# Patient Record
Sex: MALE | Race: WHITE | URBAN - METROPOLITAN AREA
[De-identification: names, ages, dates, MRNs, and addresses within clinical notes are randomized per-mention and may not be internally consistent; named-entity substitution may affect disease eponyms.]

---

## 2017-01-30 ENCOUNTER — OFFICE VISIT (OUTPATIENT)
Dept: FAMILY MEDICINE | Facility: CLINIC | Age: 37
End: 2017-01-30
Payer: COMMERCIAL

## 2017-01-30 VITALS
OXYGEN SATURATION: 100 % | BODY MASS INDEX: 24 KG/M2 | HEART RATE: 82 BPM | TEMPERATURE: 97.7 F | DIASTOLIC BLOOD PRESSURE: 85 MMHG | WEIGHT: 172 LBS | SYSTOLIC BLOOD PRESSURE: 134 MMHG

## 2017-01-30 DIAGNOSIS — F90.1 ATTENTION-DEFICIT HYPERACTIVITY DISORDER, PREDOMINANTLY HYPERACTIVE TYPE: Primary | ICD-10-CM

## 2017-01-30 PROCEDURE — 99213 OFFICE O/P EST LOW 20 MIN: CPT | Performed by: FAMILY MEDICINE

## 2017-01-30 RX ORDER — DEXTROAMPHETAMINE SACCHARATE, AMPHETAMINE ASPARTATE, DEXTROAMPHETAMINE SULFATE AND AMPHETAMINE SULFATE 5; 5; 5; 5 MG/1; MG/1; MG/1; MG/1
20 TABLET ORAL 2 TIMES DAILY
Qty: 60 TABLET | Refills: 0 | Status: SHIPPED | OUTPATIENT
Start: 2017-01-30 | End: 2017-04-19

## 2017-01-30 RX ORDER — DEXTROAMPHETAMINE SACCHARATE, AMPHETAMINE ASPARTATE, DEXTROAMPHETAMINE SULFATE AND AMPHETAMINE SULFATE 5; 5; 5; 5 MG/1; MG/1; MG/1; MG/1
20 TABLET ORAL 2 TIMES DAILY
Qty: 60 TABLET | Refills: 0 | Status: SHIPPED | OUTPATIENT
Start: 2017-02-28 | End: 2017-04-19

## 2017-01-30 RX ORDER — DEXTROAMPHETAMINE SACCHARATE, AMPHETAMINE ASPARTATE, DEXTROAMPHETAMINE SULFATE AND AMPHETAMINE SULFATE 5; 5; 5; 5 MG/1; MG/1; MG/1; MG/1
20 TABLET ORAL 2 TIMES DAILY
Qty: 60 TABLET | Refills: 0 | Status: SHIPPED | OUTPATIENT
Start: 2017-03-30

## 2017-01-30 ASSESSMENT — PAIN SCALES - GENERAL: PAINLEVEL: NO PAIN (0)

## 2017-01-30 NOTE — MR AVS SNAPSHOT
"              After Visit Summary   2017    Randall Yu    MRN: [id number]           Patient Information     Date Of Birth          1980        Visit Information        Provider Department      2017 3:00 PM Romero Tobin MD [facility]        Today's Diagnoses     Attention-deficit hyperactivity disorder, predominantly hyperactive type    -  1        Follow-ups after your visit        Who to contact     If you have questions or need follow up information about today's clinic visit or your schedule please contact [facility] directly at [phone].  Normal or non-critical lab and imaging results will be communicated to you by Pingpigeonhart, letter or phone within 4 business days after the clinic has received the results. If you do not hear from us within 7 days, please contact the clinic through Pingpigeonhart or phone. If you have a critical or abnormal lab result, we will notify you by phone as soon as possible.  Submit refill requests through [employer] or call your pharmacy and they will forward the refill request to us. Please allow 3 business days for your refill to be completed.          Additional Information About Your Visit        MyChart Information     [employer] lets you send messages to your doctor, view your test results, renew your prescriptions, schedule appointments and more. To sign up, go to [email or web address] . Click on \"Log in\" on the left side of the screen, which will take you to the Welcome page. Then click on \"Sign up Now\" on the right side of the page.     You will be asked to enter the access code listed below, as well as some personal information. Please follow the directions to create your username and password.     Your access code is: [email or web address]  Expires: 2017  4:02 PM     Your access code will  in 90 days. If you need help or a new code, please call your [facility] or [phone].        Care EveryWhere ID     " This is your Care EveryWhere ID. This could be used by other organizations to access your [place] medical records  [id number]        Your Vitals Were     Pulse Temperature Pulse Oximetry             82 97.7  F (36.5  C) (Oral) 100%          Blood Pressure from Last 3 Encounters:   01/30/17 134/85   12/13/16 124/87   12/01/16 134/84    Weight from Last 3 Encounters:   01/30/17 172 lb (78.019 kg)   12/13/16 166 lb (75.297 kg)   12/01/16 168 lb (76.204 kg)              Today, you had the following     No orders found for display         Today's Medication Changes          These changes are accurate as of: 1/30/17  4:02 PM.  If you have any questions, ask your nurse or doctor.               These medicines have changed or have updated prescriptions.        Dose/Directions    * amphetamine-dextroamphetamine 20 MG per tablet   Commonly known as:  ADDERALL   This may have changed:  Another medication with the same name was added. Make sure you understand how and when to take each.   Used for:  Attention-deficit hyperactivity disorder, predominantly hyperactive type   Changed by:  Romero Tobin MD        Dose:  20 mg   Take 1 tablet (20 mg) by mouth 2 times daily   Quantity:  60 tablet   Refills:  0       * amphetamine-dextroamphetamine 20 MG per tablet   Commonly known as:  ADDERALL   This may have changed:  You were already taking a medication with the same name, and this prescription was added. Make sure you understand how and when to take each.   Used for:  Attention-deficit hyperactivity disorder, predominantly hyperactive type   Changed by:  Romero Tobin MD        Dose:  20 mg   Take 1 tablet (20 mg) by mouth 2 times daily   Quantity:  60 tablet   Refills:  0       * amphetamine-dextroamphetamine 20 MG per tablet   Commonly known as:  ADDERALL   This may have changed:  You were already taking a medication with the same name, and this prescription was added. Make sure you understand how and when to  take each.   Used for:  Attention-deficit hyperactivity disorder, predominantly hyperactive type   Changed by:  Romero Tobin MD        Dose:  20 mg   Start taking on:  2/28/2017   Take 1 tablet (20 mg) by mouth 2 times daily   Quantity:  60 tablet   Refills:  0       * amphetamine-dextroamphetamine 20 MG per tablet   Commonly known as:  ADDERALL   This may have changed:  You were already taking a medication with the same name, and this prescription was added. Make sure you understand how and when to take each.   Used for:  Attention-deficit hyperactivity disorder, predominantly hyperactive type   Changed by:  Romero Tobin MD        Dose:  20 mg   Start taking on:  3/30/2017   Take 1 tablet (20 mg) by mouth 2 times daily   Quantity:  60 tablet   Refills:  0       * Notice:  This list has 4 medication(s) that are the same as other medications prescribed for you. Read the directions carefully, and ask your doctor or other care provider to review them with you.         Where to get your medicines      Some of these will need a paper prescription and others can be bought over the counter.  Ask your nurse if you have questions.     Bring a paper prescription for each of these medications    - amphetamine-dextroamphetamine 20 MG per tablet  - amphetamine-dextroamphetamine 20 MG per tablet  - amphetamine-dextroamphetamine 20 MG per tablet             Primary Care Provider    None Specified       No primary provider on file.        Thank you!     Thank you for choosing [facility]  for your care. Our goal is always to provide you with excellent care. Hearing back from our patients is one way we can continue to improve our services. Please take a few minutes to complete the written survey that you may receive in the mail after your visit with us. Thank you!             Your Updated Medication List - Protect others around you: Learn how to safely use, store and throw away your medicines  at www.disposemymeds.org.          This list is accurate as of: 1/30/17  4:02 PM.  Always use your most recent med list.                   Brand Name Dispense Instructions for use    * amphetamine-dextroamphetamine 20 MG per tablet    ADDERALL    60 tablet    Take 1 tablet (20 mg) by mouth 2 times daily       * amphetamine-dextroamphetamine 20 MG per tablet    ADDERALL    60 tablet    Take 1 tablet (20 mg) by mouth 2 times daily       * amphetamine-dextroamphetamine 20 MG per tablet   Start taking on:  2/28/2017    ADDERALL    60 tablet    Take 1 tablet (20 mg) by mouth 2 times daily       * amphetamine-dextroamphetamine 20 MG per tablet   Start taking on:  3/30/2017    ADDERALL    60 tablet    Take 1 tablet (20 mg) by mouth 2 times daily       PARoxetine 20 MG tablet    PAXIL    90 tablet    Take 1 tablet (20 mg) by mouth At Bedtime       ZANTAC 150 MG tablet   Generic drug:  ranitidine      Take 150 mg by mouth daily       * Notice:  This list has 4 medication(s) that are the same as other medications prescribed for you. Read the directions carefully, and ask your doctor or other care provider to review them with you.

## 2017-01-30 NOTE — PROGRESS NOTES
SUBJECTIVE:                                                    Randall Yu is a 36 year old male who presents to clinic today for the following health issues:      Medication Followup of  For his Adderall       Taking Medication as prescribed: yes    Side Effects:  None    Medication Helping Symptoms:  yes     He feels that he uses his medication during the week only   He does not have loss of appetite  He has no involuntary movement   No trouble sleeping       Problem list and histories reviewed & adjusted, as indicated.  Additional history: none    Denies tics       History reviewed. No pertinent past medical history.    History reviewed. No pertinent past surgical history.    Family History   Problem Relation Age of Onset     Other Cancer Mother      lymph nose     Other Cancer Sister      ovarian     DIABETES No family hx of      Hypertension No family hx of      Hyperlipidemia No family hx of      Colon Cancer Maternal Aunt        Social History   Substance Use Topics     Smoking status: Current Every Day Smoker -- 1.00 packs/day     Types: Cigarettes     Smokeless tobacco: Not on file     Alcohol Use: Yes      Comment: 10 drinks per week (hasn't had a drink in 6 days 10/9/16)      Current Outpatient Prescriptions   Medication Sig Dispense Refill     amphetamine-dextroamphetamine (ADDERALL) 20 MG per tablet Take 1 tablet (20 mg) by mouth 2 times daily 60 tablet 0     [START ON 2/28/2017] amphetamine-dextroamphetamine (ADDERALL) 20 MG per tablet Take 1 tablet (20 mg) by mouth 2 times daily 60 tablet 0     [START ON 3/30/2017] amphetamine-dextroamphetamine (ADDERALL) 20 MG per tablet Take 1 tablet (20 mg) by mouth 2 times daily 60 tablet 0     amphetamine-dextroamphetamine (ADDERALL) 20 MG tablet Take 1 tablet (20 mg) by mouth 2 times daily 60 tablet 0     ranitidine (ZANTAC) 150 MG tablet Take 150 mg by mouth daily       PARoxetine (PAXIL) 20 MG tablet Take 1 tablet (20 mg) by mouth At Bedtime 90 tablet 3      O: /85 mmHg  Pulse 82  Temp(Src) 97.7  F (36.5  C) (Oral)  Wt 172 lb (78.019 kg)  SpO2 100%    Head: Normocephalic, atraumatic.  Eyes: Conjunctiva clear, non icteric. PERRLA.  Ears: External ears and TMs normal BL.  Nose: Septum midline, nasal mucosa pink and moist. No discharge.  Mouth / Throat: Normal dentition.  No oral lesions. Pharynx non erythematous, tonsils without hypertrophy.  Neck: Supple, no enlarged LN, trachea midline.    Chest wall normal to inspection and palpation. Good excursion bilaterally. Lungs clear to auscultation. Good air movement bilaterally without rales, wheezes, or rhonchi.   Regular rate and  rhythm. S1 and S2 normal, no murmurs, clicks, gallops or rubs. No edema or JVD.    (F90.1) Attention-deficit hyperactivity disorder, predominantly hyperactive type  (primary encounter diagnosis)  Comment: doing well on current short acting dose being used during the week   Plan: amphetamine-dextroamphetamine (ADDERALL) 20 MG         per tablet, amphetamine-dextroamphetamine         (ADDERALL) 20 MG per tablet,         amphetamine-dextroamphetamine (ADDERALL) 20 MG         per tablet        Recheck in 3 months

## 2017-01-30 NOTE — NURSING NOTE
"Chief Complaint   Patient presents with     Recheck Medication       Initial /85 mmHg  Pulse 82  Temp(Src) 97.7  F (36.5  C) (Oral)  Wt 172 lb (78.019 kg)  SpO2 100% Estimated body mass index is 24 kg/(m^2) as calculated from the following:    Height as of 4/7/16: 5' 11\" (1.803 m).    Weight as of this encounter: 172 lb (78.019 kg).  BP completed using cuff size: bart Buckley MA      "

## 2017-04-19 ENCOUNTER — OFFICE VISIT (OUTPATIENT)
Dept: FAMILY MEDICINE | Facility: CLINIC | Age: 37
End: 2017-04-19
Payer: COMMERCIAL

## 2017-04-19 VITALS
HEART RATE: 90 BPM | HEIGHT: 71 IN | WEIGHT: 176 LBS | SYSTOLIC BLOOD PRESSURE: 138 MMHG | DIASTOLIC BLOOD PRESSURE: 90 MMHG | BODY MASS INDEX: 24.64 KG/M2 | TEMPERATURE: 97.7 F | OXYGEN SATURATION: 97 %

## 2017-04-19 DIAGNOSIS — J01.00 ACUTE NON-RECURRENT MAXILLARY SINUSITIS: Primary | ICD-10-CM

## 2017-04-19 PROCEDURE — 99213 OFFICE O/P EST LOW 20 MIN: CPT | Performed by: PHYSICIAN ASSISTANT

## 2017-04-19 NOTE — NURSING NOTE
"Chief Complaint   Patient presents with     Sinus Problem     Otalgia     Nasal Congestion       Initial /90 (Cuff Size: Adult Regular)  Pulse 90  Temp 97.7  F (36.5  C) (Oral)  Ht 5' 11\" (1.803 m)  Wt 176 lb (79.8 kg)  SpO2 97%  BMI 24.55 kg/m2 Estimated body mass index is 24.55 kg/(m^2) as calculated from the following:    Height as of this encounter: 5' 11\" (1.803 m).    Weight as of this encounter: 176 lb (79.8 kg).  Medication Reconciliation: complete   Nasrin Bobby, Certified Medical Assistant (AAMA)     "

## 2017-04-19 NOTE — MR AVS SNAPSHOT
"              After Visit Summary   2017    Randall Yu    MRN: [id number]           Patient Information     Date Of Birth          1980        Visit Information        Provider Department      2017 2:00 PM Steph Iniguez PA-C [facility]        Today's Diagnoses     Acute non-recurrent maxillary sinusitis    -  1       Follow-ups after your visit        Who to contact     If you have questions or need follow up information about today's clinic visit or your schedule please contact [facility] directly at [phone].  Normal or non-critical lab and imaging results will be communicated to you by Fastmobilehart, letter or phone within 4 business days after the clinic has received the results. If you do not hear from us within 7 days, please contact the clinic through [employer] or phone. If you have a critical or abnormal lab result, we will notify you by phone as soon as possible.  Submit refill requests through [employer] or call your pharmacy and they will forward the refill request to us. Please allow 3 business days for your refill to be completed.          Additional Information About Your Visit        MyChart Information     [employer] lets you send messages to your doctor, view your test results, renew your prescriptions, schedule appointments and more. To sign up, go to [email or web address] . Click on \"Log in\" on the left side of the screen, which will take you to the Welcome page. Then click on \"Sign up Now\" on the right side of the page.     You will be asked to enter the access code listed below, as well as some personal information. Please follow the directions to create your username and password.     Your access code is: [email or web address]  Expires: 2017  5:02 PM     Your access code will  in 90 days. If you need help or a new code, please call your [facility] or [phone].        Care EveryWhere ID     This is your Care EveryWhere " "ID. This could be used by other organizations to access your [place] medical records  [id number]        Your Vitals Were     Pulse Temperature Height Pulse Oximetry BMI (Body Mass Index)       90 97.7  F (36.5  C) (Oral) 5' 11\" (1.803 m) 97% 24.55 kg/m2        Blood Pressure from Last 3 Encounters:   04/19/17 138/90   01/30/17 134/85   12/13/16 124/87    Weight from Last 3 Encounters:   04/19/17 176 lb (79.8 kg)   01/30/17 172 lb (78 kg)   12/13/16 166 lb (75.3 kg)              Today, you had the following     No orders found for display         Today's Medication Changes          These changes are accurate as of: 4/19/17  2:45 PM.  If you have any questions, ask your nurse or doctor.               Start taking these medicines.        Dose/Directions    amoxicillin-clavulanate 875-125 MG per tablet   Commonly known as:  AUGMENTIN   Used for:  Acute non-recurrent maxillary sinusitis   Started by:  Steph Iniguez PA-C        Dose:  1 tablet   Take 1 tablet by mouth 2 times daily   Quantity:  20 tablet   Refills:  0            Where to get your medicines      These medications were sent to [id number] - [place], [place] - [address] [place] [address] AT [facility] [address]  [address], [place] [address]     Phone:  [phone]     amoxicillin-clavulanate 875-125 MG per tablet                Primary Care Provider    None Specified       No primary provider on file.        Thank you!     Thank you for choosing [facility]  for your care. Our goal is always to provide you with excellent care. Hearing back from our patients is one way we can continue to improve our services. Please take a few minutes to complete the written survey that you may receive in the mail after your visit with us. Thank you!             Your Updated Medication List - Protect others around you: Learn how to safely use, store and throw away your medicines at " www.disposemymeds.org.          This list is accurate as of: 4/19/17  2:45 PM.  Always use your most recent med list.                   Brand Name Dispense Instructions for use    amoxicillin-clavulanate 875-125 MG per tablet    AUGMENTIN    20 tablet    Take 1 tablet by mouth 2 times daily       amphetamine-dextroamphetamine 20 MG per tablet    ADDERALL    60 tablet    Take 1 tablet (20 mg) by mouth 2 times daily       PARoxetine 20 MG tablet    PAXIL    90 tablet    Take 1 tablet (20 mg) by mouth At Bedtime       ZANTAC 150 MG tablet   Generic drug:  ranitidine      Take 150 mg by mouth daily

## 2017-04-19 NOTE — PROGRESS NOTES
SUBJECTIVE:                                                    Randall Yu is a 37 year old male who presents to clinic today for the following health issues:      ENT Symptoms             Symptoms: cc Present Absent Comment   Fever/Chills   x Last night temp 101.4-first fever   Fatigue  x     Muscle Aches  x     Eye Irritation   x    Sneezing  x     Nasal Albaro/Drg x   Blood in snot    Sinus Pressure/Pain  x     Loss of smell  x     Dental pain   x    Sore Throat  x  Little bit, itchy   Swollen Glands  x     Ear Pain/Fullness  x  Sore and itchy   Cough   x    Wheeze  x     Chest Pain   x    Shortness of breath  x     Rash   x    Other         Symptom duration:  2 weeks or maybe longer   Symptom severity:  mild   Treatments tried:  dayquil and tylenol-last dose was 3 hours ago   Contacts:  unsure, works at [employer] school             Problem list and histories reviewed & adjusted, as indicated.  Additional history: as documented    Patient Active Problem List   Diagnosis     Alcohol abuse     CARDIOVASCULAR SCREENING; LDL GOAL LESS THAN 130     Generalized anxiety disorder     Attention-deficit hyperactivity disorder, predominantly hyperactive type     Cannabis dependence (H)     History reviewed. No pertinent surgical history.    Social History   Substance Use Topics     Smoking status: Current Every Day Smoker     Packs/day: 1.00     Types: Cigarettes     Smokeless tobacco: Not on file     Alcohol use Yes      Comment: 10 drinks per week (hasn't had a drink in 6 days 10/9/16)     Family History   Problem Relation Age of Onset     Other Cancer Mother      lymph nose     Other Cancer Sister      ovarian     Colon Cancer Maternal Aunt      DIABETES No family hx of      Hypertension No family hx of      Hyperlipidemia No family hx of            Reviewed and updated as needed this visit by clinical staff  Tobacco  Allergies  Med Hx  Surg Hx  Fam Hx  Soc Hx      Reviewed and updated as needed this visit by  "Provider         ROS:  As above    OBJECTIVE:                                                    /90 (Cuff Size: Adult Regular)  Pulse 90  Temp 97.7  F (36.5  C) (Oral)  Ht 5' 11\" (1.803 m)  Wt 176 lb (79.8 kg)  SpO2 97%  BMI 24.55 kg/m2  Body mass index is 24.55 kg/(m^2).  GENERAL: healthy, alert and no distress  HENT: ear canals and TM's normal, oropharynx clear, oral mucous membranes moist, sinuses: maxillary tenderness on bilateral and congestion, nose clear  NECK: no adenopathy and no asymmetry, masses, or scars  RESP: expiratory wheezes throughout  CV: regular rates and rhythm, normal S1 S2, no S3 or S4 and no murmur, click or rub      Diagnostic Test Results:  none      ASSESSMENT/PLAN:                                                      1. Acute non-recurrent maxillary sinusitis    - amoxicillin-clavulanate (AUGMENTIN) 875-125 MG per tablet; Take 1 tablet by mouth 2 times daily  Dispense: 20 tablet; Refill: 0    FUTURE APPOINTMENTS:       - Follow-up for annual visit or as needed    Steph Iniguez PA-C  [facility]  "

## 2017-05-20 DIAGNOSIS — F41.1 GENERALIZED ANXIETY DISORDER: ICD-10-CM

## 2017-05-22 RX ORDER — PAROXETINE 20 MG/1
TABLET, FILM COATED ORAL
Qty: 30 TABLET | Refills: 10 | Status: SHIPPED | OUTPATIENT
Start: 2017-05-22 | End: 2018-02-12

## 2017-05-22 NOTE — TELEPHONE ENCOUNTER
Will route to provider who he is seeing tomorrow to address refill at that time.     Ashley Sheppard RN

## 2017-05-22 NOTE — TELEPHONE ENCOUNTER
PARoxetine (PAXIL) 20 MG tablet     Last Written Prescription Date: 4/7/16  Last Fill Quantity: 90, # refills: 3  Last Office Visit with [facility] primary care provider:  4/19/17   Next 5 appointments (look out 90 days)     May 23, 2017  2:40 PM CDT   Office Visit with Romero Tobin MD   [facility] ([facility])    [address] [place] [id number] [phone]                   Last PHQ-9 score on record=   PHQ-9 SCORE 11/17/2015   Total Score 18

## 2017-08-10 ENCOUNTER — OFFICE VISIT (OUTPATIENT)
Dept: FAMILY MEDICINE | Facility: CLINIC | Age: 37
End: 2017-08-10
Payer: COMMERCIAL

## 2017-08-10 VITALS
DIASTOLIC BLOOD PRESSURE: 87 MMHG | OXYGEN SATURATION: 99 % | TEMPERATURE: 98 F | SYSTOLIC BLOOD PRESSURE: 130 MMHG | HEART RATE: 90 BPM | BODY MASS INDEX: 24.13 KG/M2 | WEIGHT: 173 LBS

## 2017-08-10 DIAGNOSIS — R19.7 DIARRHEA, UNSPECIFIED TYPE: Primary | ICD-10-CM

## 2017-08-10 PROCEDURE — 99213 OFFICE O/P EST LOW 20 MIN: CPT | Performed by: FAMILY MEDICINE

## 2017-08-10 NOTE — PROGRESS NOTES
SUBJECTIVE:                                                    Randall Yu is a 37 year old male who presents to clinic today for the following health issues:    Vomiting x 2 days - No fever or Diarrhea   Therapies Tried and outcome: Pepto Bismol - Did not work    Patient was camping   Child also had symptoms   Exposed to cooler   There was a broken egg in the cooler     He had chicken on the grill     No blood in the stool   Just diarrhea 4 per day   Vomiting     Medication refill on his Adderall    Patient has not taking it   He is taking his paxil regularly   He tried to come off at a year and he started to get panic attacks again     childrens mother is a drug addict and so she dropped them off with him and did not come back   She is living in a shelter       O:/87  Pulse 90  Temp 98  F (36.7  C) (Oral)  Wt 173 lb (78.5 kg)  SpO2 99%  BMI 24.13 kg/m2      The abdomen is soft without tenderness, guarding, mass, rebound or organomegaly. Bowel sounds are normal. No CVA tenderness or inguinal adenopathy noted.        Children are living with him           ICD-10-CM    1. Diarrhea, unspecified type R19.7 Ova and Parasite Exam Routine     Enteric Bacteria and Virus Panel by SHIR[facility] Stool     Recheck in 1 week if not better with stools   He will not turn them in until then

## 2017-08-10 NOTE — MR AVS SNAPSHOT
"              After Visit Summary   8/10/2017    Randall uY    MRN: [id number]           Patient Information     Date Of Birth          1980        Visit Information        Provider Department      8/10/2017 2:20 PM Romero Tobin MD [facility]        Today's Diagnoses     Diarrhea, unspecified type    -  1      Care Instructions    OK to take Imodium if needed for diarrhea     Use after a loose bowel movement     If diarrhea not gone in a week return for testing of parasites and cultures ( containers)           Follow-ups after your visit        Future tests that were ordered for you today     Open Future Orders        Priority Expected Expires Ordered    Ova and Parasite Exam Routine Routine  8/10/2018 8/10/2017    Enteric Bacteria and Virus Panel by SHIRA Stool Routine  8/10/2018 8/10/2017            Who to contact     If you have questions or need follow up information about today's clinic visit or your schedule please contact [facility] directly at [phone].  Normal or non-critical lab and imaging results will be communicated to you by MyChart, letter or phone within 4 business days after the clinic has received the results. If you do not hear from us within 7 days, please contact the clinic through [employer] or phone. If you have a critical or abnormal lab result, we will notify you by phone as soon as possible.  Submit refill requests through [employer] or call your pharmacy and they will forward the refill request to us. Please allow 3 business days for your refill to be completed.          Additional Information About Your Visit        [employer] Information     [employer] lets you send messages to your doctor, view your test results, renew your prescriptions, schedule appointments and more. To sign up, go to [email or web address] . Click on \"Log in\" on the left side of the screen, which will take you to the Welcome page. Then click on \"Sign up " "Now\" on the right side of the page.     You will be asked to enter the access code listed below, as well as some personal information. Please follow the directions to create your username and password.     Your access code is: [email or web address]  Expires: 2017  2:50 PM     Your access code will  in 90 days. If you need help or a new code, please call your [facility] clinic or [phone].        Care EveryWhere ID     This is your Care EveryWhere ID. This could be used by other organizations to access your [facility] medical records  [id number]        Your Vitals Were     Pulse Temperature Pulse Oximetry BMI (Body Mass Index)          90 98  F (36.7  C) (Oral) 99% 24.13 kg/m2         Blood Pressure from Last 3 Encounters:   08/10/17 130/87   17 138/90   17 134/85    Weight from Last 3 Encounters:   08/10/17 173 lb (78.5 kg)   17 176 lb (79.8 kg)   17 172 lb (78 kg)                 Today's Medication Changes          These changes are accurate as of: 8/10/17  2:50 PM.  If you have any questions, ask your nurse or doctor.               Stop taking these medicines if you haven't already. Please contact your care team if you have questions.     amoxicillin-clavulanate 875-125 MG per tablet   Commonly known as:  AUGMENTIN   Stopped by:  Romero Tobin MD                    Primary Care Provider    None Specified       No primary provider on file.        Equal Access to Services     [facility]: Hadii venkatesh castilloo [place], waaxda luqadaha, qaybta kaalmada claudette, simón aly . So [facility] [phone].    ATENCIÓN: Si habla español, tiene a peoples disposición servicios gratuitos de asistencia lingüística. Llame al [phone].    We comply with applicable federal civil rights laws and [place] laws. We do not discriminate on the basis of race, color, national origin, age, disability sex, sexual orientation or gender identity.            Thank you!     Thank " you for choosing [facility]  for your care. Our goal is always to provide you with excellent care. Hearing back from our patients is one way we can continue to improve our services. Please take a few minutes to complete the written survey that you may receive in the mail after your visit with us. Thank you!             Your Updated Medication List - Protect others around you: Learn how to safely use, store and throw away your medicines at www.disposemymeds.org.          This list is accurate as of: 8/10/17  2:50 PM.  Always use your most recent med list.                   Brand Name Dispense Instructions for use Diagnosis    amphetamine-dextroamphetamine 20 MG per tablet    ADDERALL    60 tablet    Take 1 tablet (20 mg) by mouth 2 times daily    Attention-deficit hyperactivity disorder, predominantly hyperactive type       PARoxetine 20 MG tablet    PAXIL    30 tablet    TAKE 1 TABLET BY MOUTH AT BEDTIME    Generalized anxiety disorder       ZANTAC 150 MG tablet   Generic drug:  ranitidine      Take 150 mg by mouth daily

## 2017-08-10 NOTE — LETTER
[address] [address]  Phone: [phone]  Fax: [phone]    August 10, 2017        Randall Yu  [address]  [place] [facility] [address]          To whom it may concern:    RE: Randall Yu    Patient was seen and treated today at our clinic and missed work.  Patient may return to work 8/12/2017 with the following:  No working or lifting restrictions    Please contact me for questions or concerns.      Sincerely,        Romero Tobin MD

## 2017-08-14 ENCOUNTER — TELEPHONE (OUTPATIENT)
Dept: FAMILY MEDICINE | Facility: CLINIC | Age: 37
End: 2017-08-14

## 2017-08-14 DIAGNOSIS — R19.7 DIARRHEA, UNSPECIFIED TYPE: ICD-10-CM

## 2017-08-14 PROCEDURE — 87209 SMEAR COMPLEX STAIN: CPT | Performed by: FAMILY MEDICINE

## 2017-08-14 PROCEDURE — 87506 IADNA-DNA/RNA PROBE TQ 6-11: CPT | Performed by: FAMILY MEDICINE

## 2017-08-14 PROCEDURE — 87177 OVA AND PARASITES SMEARS: CPT | Performed by: FAMILY MEDICINE

## 2017-08-14 NOTE — TELEPHONE ENCOUNTER
Reason for call:  Patient reporting a symptom    Symptom or request: Pt reports that his stools are now black, denies stomach pain. He did drop off  Stool sample a little while ago.    Duration (how long have symptoms been present): today.    Have you been treated for this before? Yes    Additional comments: any    Phone Number patient can be reached at:  Home number on file [phone] (home)    Best Time:  any    Can we leave a detailed message on this number:  YES    Call taken on 8/14/2017 at 4:20 PM by Dilcia Maya

## 2017-08-14 NOTE — TELEPHONE ENCOUNTER
If symptomatic go to ER, otherwise be seen tomorrow so we can check to see if any blood or drop in hemoglobin,

## 2017-08-14 NOTE — TELEPHONE ENCOUNTER
Notified patient of the message below and scheduled him for 1020 tomorrow with PCP.  Advised patient if symptomatic,   lightheadedness, dizziness, chest pain, shortness of breath, abdominal pain, bright red blood or blood clots, increased pulse, headache.  To ER asap.  Patient stated understanding and agreeable with the plan of care. Kandi Santiago RN C[facility] Triage.

## 2017-08-14 NOTE — TELEPHONE ENCOUNTER
Called patient. He was seen on 8/10/17 by Dr. Tobin.   He stated that he is having more more formed stools and less diarrhea but stated that the stools are black now.  He did stated that he has been using Pepto bismol over the last few days.    Patient denies any lightheadedness, dizziness, chest pain, shortness of breath, abdominal pain, bright red blood or blood clots.  He also just dropped off a stool sample.    Routed to PCP to advise.  Kandi Santiago RN [facility] Triage.

## 2017-08-14 NOTE — LETTER
[facility]   [address] [place]  [address]  [phone]                                   August 16, 2017    Randall Yu  [address]  [place] [address]        Dear Randall,    Your parasite testing and your stool cultures were all normal   You also do not have norovirus that commonly causes diarrhea    Results for orders placed or performed in visit on 08/14/17   Ova and Parasite Exam Routine   Result Value Ref Range    Specimen Description Feces     Ova and Parasite Exam Routine parasitology exam negative     Ova and Parasite Exam       Cryptosporidium, Cyclospora, and Microsporidia are not readily detected by this method. A   single negative specimen does not rule out parasitic infection.     Enteric Bacteria and Virus Panel by SHIRA Stool   Result Value Ref Range    Campylobacter group by SHIRA Not Detected NDET    Salmonella species by SHIRA Not Detected NDET    Shigella species by SHIRA Not Detected NDET    Vibrio group by SHIRA Not Detected NDET    Rotavirus A by SHIRA Not Detected NDET    Shiga toxin 1 gene by SHIRA Not Detected NDET    Shiga toxin 2 gene by SHIRA Not Detected NDET    Norovirus I and II by SHIRA Not Detected NDET    Yersinia enterocolitica by SHIRA Not Detected NDET    Enteric pathogen comment       Testing performed by multiplexed, qualitative PCR using the [employer]   Enteric Pathogens Nucleic Acid Test. Results should not be used as the sole   basis for diagnosis, treatment, or other patient management decisions.   Positive results do not rule out co-infection with other organisms that are not   detected by this test, and may not be the sole or definitive cause of patient   illness.   Negative results in the setting of clinical illness compatible with   gastroenteritis may be due to infection by pathogens that are not detected by   this test or non-infectious causes such as ulcerative colitis, irritable bowel   syndrome, or Crohn's  disease.   Note: Shiga toxin producing E. coli (STEC) typically harbor one or both genes   that encode for Shiga toxins 1 and 2.         If you have any questions please call the clinic at [phone]    Sincerely,    Romero Tobin MD  bmd

## 2017-08-15 LAB
C COLI+JEJUNI+LARI FUSA STL QL NAA+PROBE: NOT DETECTED
EC STX1 GENE STL QL NAA+PROBE: NOT DETECTED
EC STX2 GENE STL QL NAA+PROBE: NOT DETECTED
ENTERIC PATHOGEN COMMENT: NORMAL
NOROV GI+II ORF1-ORF2 JNC STL QL NAA+PR: NOT DETECTED
O+P STL MICRO: NORMAL
O+P STL MICRO: NORMAL
RVA NSP5 STL QL NAA+PROBE: NOT DETECTED
SALMONELLA SP RPOD STL QL NAA+PROBE: NOT DETECTED
SHIGELLA SP+EIEC IPAH STL QL NAA+PROBE: NOT DETECTED
SPECIMEN SOURCE: NORMAL
V CHOL+PARA RFBL+TRKH+TNAA STL QL NAA+PR: NOT DETECTED
Y ENTERO RECN STL QL NAA+PROBE: NOT DETECTED

## 2017-08-15 NOTE — PROGRESS NOTES
Your parasite testing and your stool cultures were all normal   You also do not have norovirus that commonly causes diarrhea

## 2018-02-12 DIAGNOSIS — F41.1 GENERALIZED ANXIETY DISORDER: ICD-10-CM

## 2018-02-12 NOTE — TELEPHONE ENCOUNTER
Requested Prescriptions   Pending Prescriptions Disp Refills     PARoxetine (PAXIL) 20 MG tablet 30 tablet 10     Sig: Take 1 tablet (20 mg) by mouth At Bedtime    There is no refill protocol information for this order   Last Written Prescription Date:  5-22-17  Last Fill Quantity: 30,  # refills: 10   Last office visit: 8/10/2017 with prescribing provider:     Future Office Visit:

## 2018-02-14 RX ORDER — PAROXETINE 20 MG/1
20 TABLET, FILM COATED ORAL AT BEDTIME
Qty: 30 TABLET | Refills: 5 | Status: SHIPPED | OUTPATIENT
Start: 2018-02-14 | End: 2018-02-16

## 2018-02-14 NOTE — TELEPHONE ENCOUNTER
"Requested Prescriptions   Pending Prescriptions Disp Refills     PARoxetine (PAXIL) 20 MG tablet 30 tablet 10     Sig: Take 1 tablet (20 mg) by mouth At Bedtime    SSRIs Protocol Passed    2/12/2018  2:18 PM       Passed - Recent or future visit with authorizing provider    Patient had office visit in the last year or has a visit in the next 30 days with authorizing provider.  See \"Patient Info\" tab in inbasket, or \"Choose Columns\" in Meds & Orders section of the refill encounter.            Passed - Patient is age 18 or older        No PHQ9 needed, used for anxiety.    Prescription approved per [facility] [place].  Anya Fisher RN  [facility]          "

## 2018-02-16 DIAGNOSIS — F41.1 GENERALIZED ANXIETY DISORDER: ICD-10-CM

## 2018-02-16 NOTE — TELEPHONE ENCOUNTER
Routed to provider to address request to change to 90 day supply of paxil (used for anxiety).    Anya Fisher RN  [facility]

## 2018-02-16 NOTE — TELEPHONE ENCOUNTER
"Patient would like quantity change from 30 to 90 quantity for PARoxetine (PAXIL) 20 MG tablet      Requested Prescriptions   Pending Prescriptions Disp Refills     PARoxetine (PAXIL) 20 MG tablet 30 tablet 5     Sig: Take 1 tablet (20 mg) by mouth At Bedtime    SSRIs Protocol Passed    2/16/2018  9:29 AM       Passed - Recent or future visit with authorizing provider    Patient had office visit in the last year or has a visit in the next 30 days with authorizing provider.  See \"Patient Info\" tab in inbasket, or \"Choose Columns\" in Meds & Orders section of the refill encounter.     Last Written Prescription Date:  2/14/17  Last Fill Quantity: 30,  # refills: 5   Last office visit: 7/19/2016 with prescribing provider:  7/19/16   Future Office Visit:      PHQ-9 SCORE 11/17/2015   Total Score 18     YESI-7 SCORE 6/22/2016   Total Score 5              Passed - Patient is age 18 or older          "

## 2018-02-19 RX ORDER — PAROXETINE 20 MG/1
20 TABLET, FILM COATED ORAL AT BEDTIME
Qty: 30 TABLET | Refills: 5 | Status: SHIPPED | OUTPATIENT
Start: 2018-02-19 | End: 2019-02-18

## 2018-05-09 NOTE — NURSING NOTE
"Chief Complaint   Patient presents with     Vomiting     x 2 day     Refill Request     Adderall       Initial /87  Pulse 90  Temp 98  F (36.7  C) (Oral)  Wt 173 lb (78.5 kg)  SpO2 99%  BMI 24.13 kg/m2 Estimated body mass index is 24.13 kg/(m^2) as calculated from the following:    Height as of 4/19/17: 5' 11\" (1.803 m).    Weight as of this encounter: 173 lb (78.5 kg).  Medication Reconciliation: complete   Pita Harrington MA      " The referral was sent yesterday. Spoke with the patient. She will call to see if they received it yet.      ----- Message from Thao Canales sent at 5/9/2018  3:26 PM CDT -----  Patient called she wants to go to wellness p/t and they need the referral from  [phone]

## 2018-10-22 NOTE — LETTER
[address] [address]  Phone: [phone]    April 19, 2017        Randall Yu  [address]  [facility] [facility] [address]          To whom it may concern:    RE: Randall Yu    Patient was seen and treated today at our clinic and missed work.    Please contact me for questions or concerns.      Sincerely,        Steph nIiguez PA-C   EMT/paramedic

## 2019-02-18 DIAGNOSIS — F41.1 GENERALIZED ANXIETY DISORDER: ICD-10-CM

## 2019-02-18 RX ORDER — PAROXETINE 20 MG/1
20 TABLET, FILM COATED ORAL AT BEDTIME
Qty: 30 TABLET | Refills: 0 | Status: SHIPPED | OUTPATIENT
Start: 2019-02-18 | End: 2019-03-27

## 2019-02-18 NOTE — TELEPHONE ENCOUNTER
"Requested Prescriptions   Pending Prescriptions Disp Refills     PARoxetine (PAXIL) 20 MG tablet [Pharmacy Med Name: PAROXETINE HCL 20 MG TABLET] 30 tablet 1    Last Written Prescription Date:  2/19/18  Last Fill Quantity: 30,  # refills: 5   Last office visit: 8/10/2017 with prescribing provider:     Future Office Visit:     Sig: TAKE 1 TABLET (20 MG) BY MOUTH AT BEDTIME    SSRIs Protocol Failed - 2/18/2019  1:09 AM       Failed - Recent (12 mo) or future (30 days) visit within the authorizing provider's specialty    Patient had office visit in the last 12 months or has a visit in the next 30 days with authorizing provider or within the authorizing provider's specialty.  See \"Patient Info\" tab in inbasket, or \"Choose Columns\" in Meds & Orders section of the refill encounter.             Passed - Medication is active on med list       Passed - Patient is age 18 or older          "

## 2019-02-18 NOTE — TELEPHONE ENCOUNTER
Medication is being filled for 1 time refill only due to:  Patient needs to be seen because it has been more than one year since last visit.     Encounter routed to team to reach out to patient to schedule annual visit.    Anya Fisher RN  [facility]

## 2019-02-19 NOTE — TELEPHONE ENCOUNTER
Left message to contact pharmacy and when contacted please schedule as directed below. APPLE Chavez

## 2019-03-27 DIAGNOSIS — F41.1 GENERALIZED ANXIETY DISORDER: ICD-10-CM

## 2019-03-28 RX ORDER — PAROXETINE 20 MG/1
20 TABLET, FILM COATED ORAL AT BEDTIME
Qty: 30 TABLET | Refills: 0 | Status: SHIPPED | OUTPATIENT
Start: 2019-03-28 | End: 2019-06-07

## 2019-06-06 DIAGNOSIS — F41.1 GENERALIZED ANXIETY DISORDER: ICD-10-CM

## 2019-06-06 NOTE — TELEPHONE ENCOUNTER
"Requested Prescriptions   Pending Prescriptions Disp Refills     PARoxetine (PAXIL) 20 MG tablet 30 tablet 0     Sig: Take 1 tablet (20 mg) by mouth At Bedtime   Last Written Prescription Date:  3/28/19  Last Fill Quantity: 30,  # refills: 0   Last office visit: 8/10/2017 with prescribing provider:     Future Office Visit:        SSRIs Protocol Failed - 6/6/2019 11:22 AM        Failed - Recent (12 mo) or future (30 days) visit within the authorizing provider's specialty     Patient had office visit in the last 12 months or has a visit in the next 30 days with authorizing provider or within the authorizing provider's specialty.  See \"Patient Info\" tab in inbasket, or \"Choose Columns\" in Meds & Orders section of the refill encounter.              Passed - Medication is active on med list        Passed - Patient is age 18 or older          "

## 2019-06-07 RX ORDER — PAROXETINE 20 MG/1
20 TABLET, FILM COATED ORAL AT BEDTIME
Qty: 30 TABLET | Refills: 0 | Status: SHIPPED | OUTPATIENT
Start: 2019-06-07

## 2019-06-07 NOTE — TELEPHONE ENCOUNTER
Routing refill request to provider for review/approval because:  Patient needs to be seen because it has been more than 1 year since last office visit.  2017    Route to PCP    Simona Poe RN

## 2020-07-23 ENCOUNTER — TRANSFERRED RECORDS (OUTPATIENT)
Dept: HEALTH INFORMATION MANAGEMENT | Facility: CLINIC | Age: 40
End: 2020-07-23